# Patient Record
Sex: FEMALE | Race: WHITE | NOT HISPANIC OR LATINO
[De-identification: names, ages, dates, MRNs, and addresses within clinical notes are randomized per-mention and may not be internally consistent; named-entity substitution may affect disease eponyms.]

---

## 2017-07-29 ENCOUNTER — TRANSCRIPTION ENCOUNTER (OUTPATIENT)
Age: 52
End: 2017-07-29

## 2020-07-12 ENCOUNTER — TRANSCRIPTION ENCOUNTER (OUTPATIENT)
Age: 55
End: 2020-07-12

## 2021-01-13 ENCOUNTER — OUTPATIENT (OUTPATIENT)
Dept: OUTPATIENT SERVICES | Facility: HOSPITAL | Age: 56
LOS: 1 days | End: 2021-01-13
Payer: COMMERCIAL

## 2021-01-13 DIAGNOSIS — Z20.828 CONTACT WITH AND (SUSPECTED) EXPOSURE TO OTHER VIRAL COMMUNICABLE DISEASES: ICD-10-CM

## 2021-01-13 PROCEDURE — U0003: CPT

## 2021-01-13 PROCEDURE — U0005: CPT

## 2021-01-14 LAB — SARS-COV-2 RNA SPEC QL NAA+PROBE: SIGNIFICANT CHANGE UP

## 2023-08-08 ENCOUNTER — APPOINTMENT (OUTPATIENT)
Dept: ORTHOPEDIC SURGERY | Facility: CLINIC | Age: 58
End: 2023-08-08
Payer: COMMERCIAL

## 2023-08-08 VITALS — WEIGHT: 132 LBS | HEIGHT: 66 IN | BODY MASS INDEX: 21.21 KG/M2

## 2023-08-08 DIAGNOSIS — M76.891 OTHER SPECIFIED ENTHESOPATHIES OF RIGHT LOWER LIMB, EXCLUDING FOOT: ICD-10-CM

## 2023-08-08 DIAGNOSIS — S76.011A STRAIN OF MUSCLE, FASCIA AND TENDON OF RIGHT HIP, INITIAL ENCOUNTER: ICD-10-CM

## 2023-08-08 PROCEDURE — 73502 X-RAY EXAM HIP UNI 2-3 VIEWS: CPT

## 2023-08-08 PROCEDURE — 72170 X-RAY EXAM OF PELVIS: CPT

## 2023-08-08 PROCEDURE — 73562 X-RAY EXAM OF KNEE 3: CPT | Mod: RT

## 2023-08-08 PROCEDURE — 99204 OFFICE O/P NEW MOD 45 MIN: CPT

## 2023-08-08 PROCEDURE — 99203 OFFICE O/P NEW LOW 30 MIN: CPT

## 2023-08-08 NOTE — DISCUSSION/SUMMARY
[de-identified] : Progress Note completed by Mellissa Funk PA-C * Dr. Blanchard -- The documentation recorded in this note accurately reflects the decisions made by me during this visit.   mri right knee to assess. hep and pT. follow up 4- 6 weeks.

## 2023-08-08 NOTE — ASSESSMENT
[FreeTextEntry1] :  right knee pain for years but worse after running half marathon april 2023.   medial pain along pes bursa. concern for mmt, stress injury, possible pes tendonitis.  also having right hip pain.  ttp lateral and along gluteals.  has tried some hep, nsaids without relieft.

## 2023-08-08 NOTE — HISTORY OF PRESENT ILLNESS
[Sudden] : sudden [10] : 10 [0] : 0 [Dull/Aching] : dull/aching [Sharp] : sharp [Intermittent] : intermittent [Household chores] : household chores [Leisure] : leisure [Nothing helps with pain getting better] : Nothing helps with pain getting better [de-identified] : 8/8/23:  right knee pain for years but worse after running half marathon april 2023.  also having right hip pain. [] : Post Surgical Visit: no [FreeTextEntry1] : right knee, right hip [FreeTextEntry3] : April 2023 [FreeTextEntry5] : pt hurt the knee 23 years ago running a marathon, started running again and felt a pain in the knee and hip after running. [de-identified] : motion/pressure

## 2023-08-08 NOTE — PHYSICAL EXAM
[NL (120)] : flexion 120 degrees [NL (30)] : extension 30 degrees [NL (35)] : adduction 35 degrees [NL (45)] : internal rotation 45 degrees [2+] : posterior tibialis pulse: 2+ [NL (0)] : extension 0 degrees [5___] : hamstring 5[unfilled]/5 [Equivocal] : equivocal Tai [Right] : right hip [Dysplasia] : Dysplasia [FreeTextEntry8] : ttp gluteals [4___] : quadriceps 4[unfilled]/5 [] : non-antalgic [TWNoteComboBox7] : flexion 115 degrees

## 2023-08-18 ENCOUNTER — APPOINTMENT (OUTPATIENT)
Dept: MRI IMAGING | Facility: CLINIC | Age: 58
End: 2023-08-18
Payer: COMMERCIAL

## 2023-08-18 PROCEDURE — 73721 MRI JNT OF LWR EXTRE W/O DYE: CPT | Mod: RT

## 2023-08-22 ENCOUNTER — APPOINTMENT (OUTPATIENT)
Dept: ORTHOPEDIC SURGERY | Facility: CLINIC | Age: 58
End: 2023-08-22
Payer: COMMERCIAL

## 2023-08-22 VITALS — WEIGHT: 132 LBS | BODY MASS INDEX: 21.21 KG/M2 | HEIGHT: 66 IN

## 2023-08-22 DIAGNOSIS — S83.91XD SPRAIN OF UNSPECIFIED SITE OF RIGHT KNEE, SUBSEQUENT ENCOUNTER: ICD-10-CM

## 2023-08-22 PROCEDURE — 99213 OFFICE O/P EST LOW 20 MIN: CPT

## 2023-08-22 NOTE — PHYSICAL EXAM
[NL (120)] : flexion 120 degrees [NL (30)] : extension 30 degrees [NL (35)] : adduction 35 degrees [NL (45)] : internal rotation 45 degrees [2+] : posterior tibialis pulse: 2+ [Dysplasia] : Dysplasia [Right] : right knee [NL (0)] : extension 0 degrees [4___] : quadriceps 4[unfilled]/5 [5___] : hamstring 5[unfilled]/5 [Equivocal] : equivocal Tai [FreeTextEntry8] : ttp gluteals [] : non-antalgic [TWNoteComboBox7] : flexion 115 degrees

## 2023-08-22 NOTE — HISTORY OF PRESENT ILLNESS
[10] : 10 [0] : 0 [Dull/Aching] : dull/aching [Sharp] : sharp [Intermittent] : intermittent [Household chores] : household chores [Leisure] : leisure [Nothing helps with pain getting better] : Nothing helps with pain getting better [de-identified] : 8/8/23:  right knee pain for years but worse after running half marathon april 2023.  also having right hip pain. 8/22/23:  right knee pain still.   [] : Post Surgical Visit: no [FreeTextEntry1] : rt knee [FreeTextEntry5] : Here for follow up rt knee MRI results. Symptoms continue. Has not started physical therapy. [de-identified] : HEP

## 2023-08-22 NOTE — DISCUSSION/SUMMARY
[de-identified] : rest from running. PT. follow up 6 weeks. will consider scope if pain persists once bone swelling reduces.

## 2023-08-22 NOTE — ASSESSMENT
[FreeTextEntry1] :  right knee pain for years but worse after running half marathon april 2023.   medial pain along pes bursa. mri shows mmt, medium, but stress injury on medial plateau and mild oa.  also having right hip pain.  ttp lateral and along gluteals.  has tried some hep, nsaids without relieft.

## 2023-09-18 ENCOUNTER — TRANSCRIPTION ENCOUNTER (OUTPATIENT)
Age: 58
End: 2023-09-18

## 2023-10-03 ENCOUNTER — APPOINTMENT (OUTPATIENT)
Dept: ORTHOPEDIC SURGERY | Facility: CLINIC | Age: 58
End: 2023-10-03
Payer: COMMERCIAL

## 2023-10-03 VITALS — WEIGHT: 132 LBS | HEIGHT: 66 IN | BODY MASS INDEX: 21.21 KG/M2

## 2023-10-03 VITALS — WEIGHT: 132 LBS | BODY MASS INDEX: 21.21 KG/M2 | HEIGHT: 66 IN

## 2023-10-03 PROCEDURE — 99213 OFFICE O/P EST LOW 20 MIN: CPT | Mod: 25

## 2023-10-03 PROCEDURE — 20551 NJX 1 TENDON ORIGIN/INSJ: CPT | Mod: RT

## 2023-10-05 ENCOUNTER — APPOINTMENT (OUTPATIENT)
Dept: MRI IMAGING | Facility: CLINIC | Age: 58
End: 2023-10-05
Payer: COMMERCIAL

## 2023-10-05 PROCEDURE — 73721 MRI JNT OF LWR EXTRE W/O DYE: CPT | Mod: RT

## 2023-10-17 ENCOUNTER — APPOINTMENT (OUTPATIENT)
Dept: ORTHOPEDIC SURGERY | Facility: CLINIC | Age: 58
End: 2023-10-17
Payer: COMMERCIAL

## 2023-10-17 VITALS — HEIGHT: 66 IN | WEIGHT: 132 LBS | BODY MASS INDEX: 21.21 KG/M2

## 2023-10-17 DIAGNOSIS — M23.91 UNSPECIFIED INTERNAL DERANGEMENT OF RIGHT KNEE: ICD-10-CM

## 2023-10-17 PROCEDURE — 99213 OFFICE O/P EST LOW 20 MIN: CPT

## 2023-12-05 ENCOUNTER — APPOINTMENT (OUTPATIENT)
Dept: ORTHOPEDIC SURGERY | Facility: CLINIC | Age: 58
End: 2023-12-05
Payer: COMMERCIAL

## 2023-12-05 VITALS — WEIGHT: 132 LBS | HEIGHT: 66 IN | BODY MASS INDEX: 21.21 KG/M2

## 2023-12-05 DIAGNOSIS — M70.50 OTHER BURSITIS OF KNEE, UNSPECIFIED KNEE: ICD-10-CM

## 2023-12-05 DIAGNOSIS — S80.01XD CONTUSION OF RIGHT KNEE, SUBSEQUENT ENCOUNTER: ICD-10-CM

## 2023-12-05 PROCEDURE — 99213 OFFICE O/P EST LOW 20 MIN: CPT

## 2024-01-23 ENCOUNTER — APPOINTMENT (OUTPATIENT)
Dept: ORTHOPEDIC SURGERY | Facility: CLINIC | Age: 59
End: 2024-01-23
Payer: COMMERCIAL

## 2024-01-23 DIAGNOSIS — M17.11 UNILATERAL PRIMARY OSTEOARTHRITIS, RIGHT KNEE: ICD-10-CM

## 2024-01-23 PROCEDURE — 99212 OFFICE O/P EST SF 10 MIN: CPT

## 2024-01-23 NOTE — DISCUSSION/SUMMARY
[de-identified] : ease back into running in spring. will consider repeat mri if pain returns. will consider scope if bone edema gone and pain present.

## 2024-01-23 NOTE — ASSESSMENT
[FreeTextEntry1] : right knee pain for years but worse after running half marathon april 2023. still some medial pain on pes tendons and proximal tibial plateau. improved with pes injection. mri 10 23 improved but still some bone edema.  pain mostly gone.   also having right hip pain. ttp lateral and along gluteals. improved.

## 2024-01-23 NOTE — HISTORY OF PRESENT ILLNESS
[Gradual] : gradual [8] : 8 [0] : 0 [Dull/Aching] : dull/aching [Sharp] : sharp [Throbbing] : throbbing [Rest] : rest [Exercising] : exercising [de-identified] : 8/8/23:  right knee pain for years but worse after running half marathon april 2023.  also having right hip pain. 8/22/23:  right knee pain still.  10/3/23:  still having pain in the right knee.  mild improvement but still some pain. 10/17/23:  still some improvement. 12/5/23:  right knee pain persists. 1//23/24: right knee pain much better.  [] : Post Surgical Visit: no [FreeTextEntry1] : right knee [de-identified] : physical therapy

## 2024-01-23 NOTE — PHYSICAL EXAM
[NL (120)] : flexion 120 degrees [NL (30)] : extension 30 degrees [NL (35)] : adduction 35 degrees [NL (45)] : internal rotation 45 degrees [2+] : posterior tibialis pulse: 2+ [Dysplasia] : Dysplasia [Right] : right knee [NL (0)] : extension 0 degrees [4___] : quadriceps 4[unfilled]/5 [5___] : hamstring 5[unfilled]/5 [Equivocal] : equivocal Tai [FreeTextEntry8] : ttp gluteals [] : no erythema [TWNoteComboBox7] : flexion 130 degrees

## 2024-03-05 ENCOUNTER — APPOINTMENT (OUTPATIENT)
Dept: ORTHOPEDIC SURGERY | Facility: CLINIC | Age: 59
End: 2024-03-05
Payer: COMMERCIAL

## 2024-03-05 DIAGNOSIS — S83.231D COMPLEX TEAR OF MEDIAL MENISCUS, CURRENT INJURY, RIGHT KNEE, SUBSEQUENT ENCOUNTER: ICD-10-CM

## 2024-03-05 PROCEDURE — 99212 OFFICE O/P EST SF 10 MIN: CPT

## 2024-03-05 NOTE — PHYSICAL EXAM
[NL (120)] : flexion 120 degrees [NL (30)] : extension 30 degrees [NL (35)] : adduction 35 degrees [NL (45)] : external rotation 45 degrees [2+] : posterior tibialis pulse: 2+ [Dysplasia] : Dysplasia [Right] : right knee [NL (0)] : extension 0 degrees [4___] : quadriceps 4[unfilled]/5 [5___] : hamstring 5[unfilled]/5 [Equivocal] : equivocal Tai [FreeTextEntry8] : ttp gluteals [] : no erythema [TWNoteComboBox7] : flexion 120 degrees

## 2024-03-05 NOTE — DISCUSSION/SUMMARY
[de-identified] : will plan on right knee scope for pmm rba discussed including pain from oa and bone edema.  patient has failed extensive PT and non op treatment. return postop  Progress Note completed by Mellissa Funk PA-C * Dr. Blanchard -- The documentation recorded in this note accurately reflects the decisions made by me during this visit.

## 2024-03-05 NOTE — ASSESSMENT
[FreeTextEntry1] : right knee pain for years but worse after running half marathon april 2023. still some medial pain on pes tendons and proximal tibial plateau. mri 10 23 improved but still some bone edema, mmt present, oa present.  pain again.  buckling now.   also having right hip pain. ttp lateral and along gluteals. improved.

## 2024-03-05 NOTE — HISTORY OF PRESENT ILLNESS
[Gradual] : gradual [8] : 8 [0] : 0 [Dull/Aching] : dull/aching [Sharp] : sharp [Throbbing] : throbbing [Rest] : rest [Exercising] : exercising [de-identified] : 8/8/23:  right knee pain for years but worse after running half marathon april 2023.  also having right hip pain. 8/22/23:  right knee pain still.  10/3/23:  still having pain in the right knee.  mild improvement but still some pain. 10/17/23:  still some improvement. 12/5/23:  right knee pain persists. 1//23/24: right knee pain much better. 3/5/24:  follow up right knee.  reports pain worsening. no new injury.  [FreeTextEntry1] : right knee [] : Post Surgical Visit: no [FreeTextEntry5] : Pt has  had increased pain and buckling in her right knee since last visit  [de-identified] : physical therapy

## 2025-09-02 ENCOUNTER — NON-APPOINTMENT (OUTPATIENT)
Age: 60
End: 2025-09-02